# Patient Record
Sex: MALE | Race: WHITE
[De-identification: names, ages, dates, MRNs, and addresses within clinical notes are randomized per-mention and may not be internally consistent; named-entity substitution may affect disease eponyms.]

---
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
## 2023-04-19 ENCOUNTER — HOSPITAL ENCOUNTER (EMERGENCY)
Dept: HOSPITAL 53 - M ED | Age: 18
Discharge: HOME | End: 2023-04-19
Payer: COMMERCIAL

## 2023-04-19 VITALS — SYSTOLIC BLOOD PRESSURE: 133 MMHG | DIASTOLIC BLOOD PRESSURE: 72 MMHG

## 2023-04-19 VITALS — WEIGHT: 217.16 LBS | BODY MASS INDEX: 34.9 KG/M2 | HEIGHT: 66 IN

## 2023-04-19 DIAGNOSIS — Z79.1: ICD-10-CM

## 2023-04-19 DIAGNOSIS — F32.A: ICD-10-CM

## 2023-04-19 DIAGNOSIS — F41.9: ICD-10-CM

## 2023-04-19 DIAGNOSIS — K08.89: ICD-10-CM

## 2023-04-19 DIAGNOSIS — Z79.2: ICD-10-CM

## 2023-04-19 DIAGNOSIS — G44.009: Primary | ICD-10-CM

## 2023-04-19 DIAGNOSIS — Z79.899: ICD-10-CM

## 2023-04-19 DIAGNOSIS — F12.10: ICD-10-CM

## 2023-04-19 PROCEDURE — 99283 EMERGENCY DEPT VISIT LOW MDM: CPT

## 2023-04-19 PROCEDURE — 96372 THER/PROPH/DIAG INJ SC/IM: CPT

## 2023-10-05 ENCOUNTER — HOSPITAL ENCOUNTER (EMERGENCY)
Dept: HOSPITAL 53 - M ED | Age: 18
Discharge: LEFT BEFORE DISCHARGE | End: 2023-10-05
Payer: COMMERCIAL

## 2023-10-05 VITALS
OXYGEN SATURATION: 98 % | BODY MASS INDEX: 30.36 KG/M2 | WEIGHT: 212.08 LBS | HEIGHT: 70 IN | TEMPERATURE: 98.7 F | SYSTOLIC BLOOD PRESSURE: 144 MMHG | DIASTOLIC BLOOD PRESSURE: 81 MMHG

## 2023-10-05 DIAGNOSIS — Z53.21: Primary | ICD-10-CM
